# Patient Record
Sex: MALE | Race: WHITE
[De-identification: names, ages, dates, MRNs, and addresses within clinical notes are randomized per-mention and may not be internally consistent; named-entity substitution may affect disease eponyms.]

---
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
## 2020-02-02 ENCOUNTER — HOSPITAL ENCOUNTER (EMERGENCY)
Dept: HOSPITAL 75 - ER | Age: 20
Discharge: HOME | End: 2020-02-02
Payer: COMMERCIAL

## 2020-02-02 VITALS — DIASTOLIC BLOOD PRESSURE: 50 MMHG | SYSTOLIC BLOOD PRESSURE: 112 MMHG

## 2020-02-02 VITALS — HEIGHT: 75.98 IN | WEIGHT: 198.42 LBS | BODY MASS INDEX: 24.16 KG/M2

## 2020-02-02 DIAGNOSIS — J10.1: Primary | ICD-10-CM

## 2020-02-02 DIAGNOSIS — Z88.0: ICD-10-CM

## 2020-02-02 PROCEDURE — 87804 INFLUENZA ASSAY W/OPTIC: CPT

## 2020-02-02 NOTE — ED COUGH/URI
General


Chief Complaint:  Cough/Cold/Flu Symptoms


Stated Complaint:  COLD/FLU SYMPTOMS


Nursing Triage Note:  


C/O body aches, sore throat, fever (100) diarrhea, cough, since last noght


Sepsis Screen:  Possible Sepsis Risk


Source:  patient


Exam Limitations:  no limitations





History of Present Illness


Date Seen by Provider:  Feb 2, 2020


Time Seen by Provider:  17:41


Initial Comments


19-year-old male patient presents with fever of 100, bodyaches, sore throat, 

diarrhea, cough, and rhinorrhea beginning last night.  Denies improvement with 

Tylenol.


Timing/Duration:  yesterday, getting worse


Severity/Quality:  productive cough (clear)


Prior Episodes/Possible Cause:  no prior episodes


Modifying Factors:  Worse With Coughing





Allergies and Home Medications


Allergies


Coded Allergies:  


     Penicillins (Verified  Adverse Reaction, Mild, 2/25/12)





Home Medications


Ondansetron 8 Mg Tab.rapdis, 8 MG PO Q6H PRN for NAUSEA/VOMITING


   Prescribed by: SCOT DOUGHERTY on 2/2/20 5809





Patient Home Medication List


Home Medication List Reviewed:  Yes





Review of Systems


Review of Systems


Constitutional:  see HPI, chills; No dizziness; fever, malaise


EENTM:  see HPI, ear pain, nose congestion, throat pain; No hoarseness, No 

throat swelling


Respiratory:  cough, phlegm; No short of breath, No stridor, No wheezing


Cardiovascular:  no symptoms reported


Gastrointestinal:  No abdominal pain, No constipation; diarrhea; No hematemesis,

No heartburn, No loss of appetite, No nausea, No vomiting


Genitourinary:  no symptoms reported


Musculoskeletal:  see HPI, other (generalized body aches)


Skin:  no symptoms reported


Psychiatric/Neurological:  Headache





All Other Systems Reviewed


Negative Unless Noted:  Yes (Negative excepted noted.)





Past Medical-Social-Family Hx


Past Med/Social Hx:  Reviewed Nursing Past Med/Soc Hx


Patient Social History


Alcohol Use:  Occasionally Uses


Recreational Drug Use:  No


Smoking Status:  Never a Smoker


2nd Hand Smoke Exposure:  No


Recent Foreign Travel:  No


Contact w/Someone Who Travel:  No


Recent Infectious Disease Expo:  No


Physical Abuse:  No


Sexual Abuse:  No


Mistreated:  No


Fear:  No





Immunizations Up To Date


Tetanus Booster (TDap):  Less than 5yrs


Date of Influenza Vaccine:  Oct 1, 2012





Past Medical History


Surgeries:  No


Respiratory:  No


Cardiac:  No


Neurological:  No


Genitourinary:  No


Musculoskeletal:  No


Endocrine:  No


HEENT:  No


Cancer:  No


Psychosocial:  No


Integumentary:  No





Family Medical History


Reviewed Nursing Family Hx


No Pertinent Family Hx





Physical Exam





Vital Signs - First Documented








 2/2/20 2/2/20





 16:26 18:19


 


Temp 37.3 


 


Pulse 100 


 


Resp 16 


 


B/P (MAP) 112/50 (70) 


 


Pulse Ox  98


 


O2 Delivery  Room Air





Capillary Refill : Less Than 3 Seconds


Height: 6'4.00"


Weight: 190lbs. oz. 86.526061ag; 24.00 BMI


Method:Stated


General Appearance:  WD/WN, no apparent distress


HEENT:  PERRL/EOMI, pharyngeal erythema, other (positive nasal congestion, 

rhinorrhea, and air fluid levels to the bilateral TMs.  No evidence of erythema,

retractions, bulging, or perforation to the TMs.)


Neck:  non-tender, full range of motion, supple, lymphadenopathy (R), 

lymphadenopathy (L)


Respiratory:  lungs clear, normal breath sounds, no respiratory distress, no 

accessory muscle use


Cardiovascular:  normal peripheral pulses, regular rate, rhythm, no murmur


Extremities:  normal inspection, normal capillary refill


Neurologic/Psychiatric:  alert, normal mood/affect, oriented x 3


Skin:  normal color, warm/dry





Progress/Results/Core Measures


Suspected Sepsis


Recent Fever Within 48 Hours:  Yes


Infection Criteria Present:  Suspected New Infection


New/Unexplained  Altered Menta:  No


Sepsis Screen:  Possible Sepsis Risk


SIRS


Temperature: 


Pulse: 100 


Respiratory Rate: 16


 


Blood Pressure 112 /50 


Mean: 70





Results/Orders


Micro Results





Microbiology


2/2/20 Influenza Types A,B Antigen ([facility]ER) - Final, Complete


         





My Orders





Orders - SCOT DOUGHERTY


Rx-Oseltamivir Caps (Rx-Tamiflu Caps) (2/2/20 17:54)


Rx-Ondansetron Po (Rx-Zofran Po) (2/2/20 17:54)





Vital Signs/I&O











 2/2/20 2/2/20





 16:26 18:19


 


Temp 37.3 37.3


 


Pulse 100 100


 


Resp 16 16


 


B/P (MAP) 112/50 (70) 112/50 (70)


 


Pulse Ox  98


 


O2 Delivery  Room Air





Capillary Refill : Less Than 3 Seconds








Blood Pressure Mean:                    70











Departure


Communication (Admissions)


Patient seen and evaluated.  Lab findings discussed with the patient.  Plan for 

discharge to home with oral Tamiflu and Zofran.





Impression





   Primary Impression:  


   Influenza B


Disposition:  01 HOME, SELF-CARE


Condition:  Improved





Departure-Patient Inst.


Decision time for Depature:  17:42


Referrals:  


[facility] (PCP/Family)


Primary Care Physician


Patient Instructions:  Flu, Adult (DC)





Add. Discharge Instructions:  


All discharge instructions reviewed with patient and/or family. Voiced 

understanding.  Tamiflu 75 mg by mouth twice daily for 5 days.  Over-the-counter

Tylenol as directed for fever or pain.  Ibuprofen 800 mg by mouth every 8 hours 

as needed for pain or fever.  Stay well hydrated.  Rest.  Over-the-counter 

decongestants, antihistamines, and cough suppressants as needed for symptomatic 

relief.  Follow-up with [facility] for recheck as an outpatient 

if no improvement in symptoms.  Return to the emergency department for worsened 

symptoms or any other concerns.


Scripts


Ondansetron (Ondansetron Odt) 8 Mg Tab.rapdis


8 MG PO Q6H PRN for NAUSEA/VOMITING, #10 TAB 0 Refills


   Prov: SCOT DOUGHERTY         2/2/20


Work/School Note:  Work Release Form   Date Seen in the Emergency Department:  

Feb 2, 2020


   Return to Work:  Feb 4, 2020


   Restrictions:  Return-No Fever (24hrs), Return-No Vomiting(24hrs)











SCOT DOUGHERTY            Feb 2, 2020 17:43 Positive family history, poor diet  Lipid panel added to the rest of her blood work for assessment